# Patient Record
Sex: MALE | Race: WHITE | URBAN - METROPOLITAN AREA
[De-identification: names, ages, dates, MRNs, and addresses within clinical notes are randomized per-mention and may not be internally consistent; named-entity substitution may affect disease eponyms.]

---

## 2018-09-21 ENCOUNTER — ANESTHESIA EVENT (OUTPATIENT)
Dept: OPERATING ROOM | Age: 63
End: 2018-09-21
Payer: COMMERCIAL

## 2018-09-21 ENCOUNTER — PAT TELEPHONE (OUTPATIENT)
Dept: PREADMISSION TESTING | Age: 63
End: 2018-09-21

## 2018-09-21 VITALS — WEIGHT: 215 LBS | HEIGHT: 72 IN | BODY MASS INDEX: 29.12 KG/M2

## 2018-09-21 RX ORDER — CIPROFLOXACIN 2 MG/ML
400 INJECTION, SOLUTION INTRAVENOUS ONCE
Status: CANCELLED | OUTPATIENT
Start: 2018-09-21 | End: 2018-09-21

## 2018-09-21 RX ORDER — METHOCARBAMOL 500 MG/1
500 TABLET, FILM COATED ORAL 4 TIMES DAILY
COMMUNITY

## 2018-09-21 RX ORDER — MULTIVIT-MIN/IRON/FOLIC ACID/K 18-600-40
CAPSULE ORAL
COMMUNITY

## 2018-09-21 RX ORDER — DULOXETIN HYDROCHLORIDE 30 MG/1
30 CAPSULE, DELAYED RELEASE ORAL NIGHTLY
COMMUNITY

## 2018-09-21 RX ORDER — THEOPHYLLINE 300 MG/1
300 TABLET, EXTENDED RELEASE ORAL 2 TIMES DAILY
COMMUNITY

## 2018-09-21 RX ORDER — GABAPENTIN 600 MG/1
600 TABLET ORAL 4 TIMES DAILY
COMMUNITY

## 2018-09-21 RX ORDER — IPRATROPIUM BROMIDE AND ALBUTEROL SULFATE 2.5; .5 MG/3ML; MG/3ML
1 SOLUTION RESPIRATORY (INHALATION) EVERY 4 HOURS
COMMUNITY

## 2018-09-21 RX ORDER — ASPIRIN 81 MG/1
81 TABLET, CHEWABLE ORAL DAILY
COMMUNITY

## 2018-09-21 NOTE — PRE-PROCEDURE INSTRUCTIONS
C-Difficile admission screening and protocol:     * Admitted with diarrhea? no     *Prior history of C-Diff. In last 3 months?yes     *Antibiotic use in the past 6-8 weeks?yes     *Prior hospitalization or nursing home in the last month?   no         [address] time___1100 per pt_________        Surgery time____1300________    Take the following medications with a sip of water:Do duoneb,dulera, albuterol Take your inhaler as directed the DOS and bring with you DOS. gabapentin and theodur    Do not eat or drink anything after 12:00 midnight prior to your surgery. This includes water chewing gum, mints and ice chips. You may brush your teeth and gargle the morning of your surgery, but do not swallow the water     Please see your family doctor/pediatrician for a history and physical and/or concerning medications. Bring any test results/reports from your physicians office. If you are under the care of a heart doctor or specialist doctor, please be aware that you may be asked to them for clearance    You may be asked to stop blood thinners such as Coumadin, Plavix, Fragmin, Lovenox, etc., or any anti-inflammatories such as:  Aspirin, Ibuprofen, Advil, Naproxen prior to your surgery. We also ask that you stop any OTC medications such as fish oil, vitamin E, glucosamine, garlic, Multivitamins, COQ 10, etc.    We ask that you do not smoke 24 hours prior to surgery  We ask that you do not  drink any alcoholic beverages 24 hours prior to surgery     You must make arrangements for a responsible adult to take you home after your surgery. For your safety you will not be allowed to leave alone or drive yourself home. Your surgery will be cancelled if you do not have a ride home. Also for your safety, it is strongly suggested that someone stay with you the first 24 hours after your surgery.      A parent or legal guardian must accompany a child scheduled for surgery and plan to stay at the hospital until the child is discharged. Please do not bring other children with you. For your comfort, please wear simple loose fitting clothing to the hospital.  Please do not bring valuables. Do not wear any make-up or nail polish on your fingers or toes      For your safety, please do not wear any jewelry or body piercing's on the day of surgery. All jewelry must be removed. If you have dentures, they will be removed before going to operating room. For your convenience, we will provide you with a container. If you wear contact lenses or glasses, they will be removed, please bring a case for them. If you have a living will and a durable power of  for healthcare, please bring in a copy. As part of our patient safety program to minimize surgical site infections, we ask you to do the following:    · Please notify your surgeon if you develop any illness between         now and the  day of your surgery. · This includes a cough, cold, fever, sore throat, nausea,         or vomiting, and diarrhea, etc.  ·  Please notify your surgeon if you experience dizziness, shortness         of breath or blurred vision between now and the time of your surgery. Do not shave your operative site 96 hours prior to surgery. For face and neck surgery, men may use an electric razor 48 hours   prior to surgery. You may shower the night before surgery or the morning of   your surgery with an antibacterial soap. You will need to bring a photo ID and insurance card    [facility] has an onsite pharmacy, would you like to utilize our pharmacy     If you will be staying overnight and use a C-pap machine, please bring   your C-pap to hospital     Our goal is to provide you with excellent care, therefore, visitors will be limited to two(2) in the room at a time so that we may focus on providing this care for you.           Please contact pre-admission testing if you have

## 2018-09-24 ENCOUNTER — HOSPITAL ENCOUNTER (OUTPATIENT)
Dept: CT IMAGING | Age: 63
Discharge: HOME | End: 2018-09-24
Attending: UROLOGY
Payer: COMMERCIAL

## 2018-09-24 ENCOUNTER — ANESTHESIA (OUTPATIENT)
Dept: OPERATING ROOM | Age: 63
End: 2018-09-24
Payer: COMMERCIAL

## 2018-09-24 ENCOUNTER — HOSPITAL ENCOUNTER (OUTPATIENT)
Age: 63
Setting detail: OUTPATIENT SURGERY
Discharge: HOME | End: 2018-09-24
Attending: UROLOGY | Admitting: UROLOGY
Payer: COMMERCIAL

## 2018-09-24 VITALS
SYSTOLIC BLOOD PRESSURE: 125 MMHG | OXYGEN SATURATION: 96 % | DIASTOLIC BLOOD PRESSURE: 71 MMHG | BODY MASS INDEX: 28.79 KG/M2 | HEIGHT: 72 IN | WEIGHT: 212.52 LBS | RESPIRATION RATE: 16 BRPM | TEMPERATURE: 97 F | HEART RATE: 83 BPM

## 2018-09-24 VITALS — SYSTOLIC BLOOD PRESSURE: 118 MMHG | OXYGEN SATURATION: 96 % | DIASTOLIC BLOOD PRESSURE: 71 MMHG

## 2018-09-24 DIAGNOSIS — N10 ACUTE PYELONEPHRITIS: ICD-10-CM

## 2018-09-24 DIAGNOSIS — Z87.440 HISTORY OF URINARY TRACT INFECTION: ICD-10-CM

## 2018-09-24 DIAGNOSIS — N13.8 ENLARGED PROSTATE WITH URINARY OBSTRUCTION: ICD-10-CM

## 2018-09-24 DIAGNOSIS — N39.0 URINARY TRACT INFECTION: ICD-10-CM

## 2018-09-24 DIAGNOSIS — N40.1 ENLARGED PROSTATE WITH URINARY OBSTRUCTION: ICD-10-CM

## 2018-09-24 LAB
GFR AFRICAN AMERICAN: >60
GFR NON-AFRICAN AMERICAN: >60
PERFORMED ON: NORMAL
POC CREATININE: 1 MG/DL (ref 0.8–1.3)
POC SAMPLE TYPE: NORMAL

## 2018-09-24 PROCEDURE — 7100000000 HC PACU RECOVERY - FIRST 15 MIN: Performed by: UROLOGY

## 2018-09-24 PROCEDURE — 6360000004 HC RX CONTRAST MEDICATION: Performed by: UROLOGY

## 2018-09-24 PROCEDURE — 6360000002 HC RX W HCPCS: Performed by: NURSE ANESTHETIST, CERTIFIED REGISTERED

## 2018-09-24 PROCEDURE — 2580000003 HC RX 258: Performed by: UROLOGY

## 2018-09-24 PROCEDURE — 7100000010 HC PHASE II RECOVERY - FIRST 15 MIN: Performed by: UROLOGY

## 2018-09-24 PROCEDURE — 3600000002 HC SURGERY LEVEL 2 BASE: Performed by: UROLOGY

## 2018-09-24 PROCEDURE — 3700000000 HC ANESTHESIA ATTENDED CARE: Performed by: UROLOGY

## 2018-09-24 PROCEDURE — 7100000011 HC PHASE II RECOVERY - ADDTL 15 MIN: Performed by: UROLOGY

## 2018-09-24 PROCEDURE — 2580000003 HC RX 258: Performed by: ANESTHESIOLOGY

## 2018-09-24 PROCEDURE — 3700000001 HC ADD 15 MINUTES (ANESTHESIA): Performed by: UROLOGY

## 2018-09-24 PROCEDURE — 74178 CT ABD&PLV WO CNTR FLWD CNTR: CPT

## 2018-09-24 PROCEDURE — 82565 ASSAY OF CREATININE: CPT

## 2018-09-24 PROCEDURE — 2709999900 HC NON-CHARGEABLE SUPPLY: Performed by: UROLOGY

## 2018-09-24 PROCEDURE — 6360000002 HC RX W HCPCS: Performed by: UROLOGY

## 2018-09-24 PROCEDURE — 3600000012 HC SURGERY LEVEL 2 ADDTL 15MIN: Performed by: UROLOGY

## 2018-09-24 PROCEDURE — 2500000003 HC RX 250 WO HCPCS: Performed by: NURSE ANESTHETIST, CERTIFIED REGISTERED

## 2018-09-24 RX ORDER — PROPOFOL 10 MG/ML
INJECTION, EMULSION INTRAVENOUS CONTINUOUS PRN
Status: DISCONTINUED | OUTPATIENT
Start: 2018-09-24 | End: 2018-09-24 | Stop reason: SDUPTHER

## 2018-09-24 RX ORDER — CIPROFLOXACIN 2 MG/ML
400 INJECTION, SOLUTION INTRAVENOUS ONCE
Status: COMPLETED | OUTPATIENT
Start: 2018-09-24 | End: 2018-09-24

## 2018-09-24 RX ORDER — LIDOCAINE HYDROCHLORIDE 20 MG/ML
INJECTION, SOLUTION INFILTRATION; PERINEURAL PRN
Status: DISCONTINUED | OUTPATIENT
Start: 2018-09-24 | End: 2018-09-24 | Stop reason: SDUPTHER

## 2018-09-24 RX ORDER — MORPHINE SULFATE 2 MG/ML
1 INJECTION, SOLUTION INTRAMUSCULAR; INTRAVENOUS EVERY 5 MIN PRN
Status: DISCONTINUED | OUTPATIENT
Start: 2018-09-24 | End: 2018-09-24 | Stop reason: HOSPADM

## 2018-09-24 RX ORDER — DEXAMETHASONE SODIUM PHOSPHATE 4 MG/ML
INJECTION, SOLUTION INTRA-ARTICULAR; INTRALESIONAL; INTRAMUSCULAR; INTRAVENOUS; SOFT TISSUE PRN
Status: DISCONTINUED | OUTPATIENT
Start: 2018-09-24 | End: 2018-09-24 | Stop reason: SDUPTHER

## 2018-09-24 RX ORDER — TAMSULOSIN HYDROCHLORIDE 0.4 MG/1
0.4 CAPSULE ORAL DAILY
Qty: 90 CAPSULE | Refills: 3 | Status: SHIPPED | OUTPATIENT
Start: 2018-09-24 | End: 2018-09-24

## 2018-09-24 RX ORDER — MORPHINE SULFATE 2 MG/ML
2 INJECTION, SOLUTION INTRAMUSCULAR; INTRAVENOUS EVERY 5 MIN PRN
Status: DISCONTINUED | OUTPATIENT
Start: 2018-09-24 | End: 2018-09-24 | Stop reason: HOSPADM

## 2018-09-24 RX ORDER — MEPERIDINE HYDROCHLORIDE 25 MG/ML
12.5 INJECTION INTRAMUSCULAR; INTRAVENOUS; SUBCUTANEOUS EVERY 5 MIN PRN
Status: DISCONTINUED | OUTPATIENT
Start: 2018-09-24 | End: 2018-09-24 | Stop reason: HOSPADM

## 2018-09-24 RX ORDER — MIDAZOLAM HYDROCHLORIDE 1 MG/ML
INJECTION INTRAMUSCULAR; INTRAVENOUS PRN
Status: DISCONTINUED | OUTPATIENT
Start: 2018-09-24 | End: 2018-09-24 | Stop reason: SDUPTHER

## 2018-09-24 RX ORDER — FENTANYL CITRATE 50 UG/ML
INJECTION, SOLUTION INTRAMUSCULAR; INTRAVENOUS PRN
Status: DISCONTINUED | OUTPATIENT
Start: 2018-09-24 | End: 2018-09-24 | Stop reason: SDUPTHER

## 2018-09-24 RX ORDER — OXYCODONE HYDROCHLORIDE AND ACETAMINOPHEN 5; 325 MG/1; MG/1
1 TABLET ORAL PRN
Status: DISCONTINUED | OUTPATIENT
Start: 2018-09-24 | End: 2018-09-24 | Stop reason: HOSPADM

## 2018-09-24 RX ORDER — FENTANYL CITRATE 50 UG/ML
50 INJECTION, SOLUTION INTRAMUSCULAR; INTRAVENOUS EVERY 5 MIN PRN
Status: DISCONTINUED | OUTPATIENT
Start: 2018-09-24 | End: 2018-09-24 | Stop reason: HOSPADM

## 2018-09-24 RX ORDER — TAMSULOSIN HYDROCHLORIDE 0.4 MG/1
0.4 CAPSULE ORAL DAILY
Qty: 90 CAPSULE | Refills: 3 | Status: SHIPPED | OUTPATIENT
Start: 2018-09-24

## 2018-09-24 RX ORDER — SODIUM CHLORIDE 0.9 % (FLUSH) 0.9 %
10 SYRINGE (ML) INJECTION EVERY 12 HOURS SCHEDULED
Status: DISCONTINUED | OUTPATIENT
Start: 2018-09-24 | End: 2018-09-24 | Stop reason: HOSPADM

## 2018-09-24 RX ORDER — OXYCODONE HYDROCHLORIDE AND ACETAMINOPHEN 5; 325 MG/1; MG/1
2 TABLET ORAL PRN
Status: DISCONTINUED | OUTPATIENT
Start: 2018-09-24 | End: 2018-09-24 | Stop reason: HOSPADM

## 2018-09-24 RX ORDER — SODIUM CHLORIDE 9 MG/ML
INJECTION, SOLUTION INTRAVENOUS CONTINUOUS
Status: DISCONTINUED | OUTPATIENT
Start: 2018-09-24 | End: 2018-09-24 | Stop reason: HOSPADM

## 2018-09-24 RX ORDER — GLYCOPYRROLATE 0.2 MG/ML
INJECTION INTRAMUSCULAR; INTRAVENOUS PRN
Status: DISCONTINUED | OUTPATIENT
Start: 2018-09-24 | End: 2018-09-24 | Stop reason: SDUPTHER

## 2018-09-24 RX ORDER — ONDANSETRON 2 MG/ML
INJECTION INTRAMUSCULAR; INTRAVENOUS PRN
Status: DISCONTINUED | OUTPATIENT
Start: 2018-09-24 | End: 2018-09-24 | Stop reason: SDUPTHER

## 2018-09-24 RX ORDER — MAGNESIUM HYDROXIDE 1200 MG/15ML
LIQUID ORAL
Status: COMPLETED | OUTPATIENT
Start: 2018-09-24 | End: 2018-09-24

## 2018-09-24 RX ORDER — FINASTERIDE 5 MG/1
5 TABLET, FILM COATED ORAL DAILY
Qty: 90 TABLET | Refills: 3 | Status: SHIPPED | OUTPATIENT
Start: 2018-09-24 | End: 2018-09-24

## 2018-09-24 RX ORDER — FENTANYL CITRATE 50 UG/ML
25 INJECTION, SOLUTION INTRAMUSCULAR; INTRAVENOUS EVERY 5 MIN PRN
Status: DISCONTINUED | OUTPATIENT
Start: 2018-09-24 | End: 2018-09-24 | Stop reason: HOSPADM

## 2018-09-24 RX ORDER — FINASTERIDE 5 MG/1
5 TABLET, FILM COATED ORAL DAILY
Qty: 90 TABLET | Refills: 3 | Status: SHIPPED | OUTPATIENT
Start: 2018-09-24

## 2018-09-24 RX ORDER — SODIUM CHLORIDE 0.9 % (FLUSH) 0.9 %
10 SYRINGE (ML) INJECTION PRN
Status: DISCONTINUED | OUTPATIENT
Start: 2018-09-24 | End: 2018-09-24 | Stop reason: HOSPADM

## 2018-09-24 RX ORDER — ONDANSETRON 2 MG/ML
4 INJECTION INTRAMUSCULAR; INTRAVENOUS
Status: DISCONTINUED | OUTPATIENT
Start: 2018-09-24 | End: 2018-09-24 | Stop reason: HOSPADM

## 2018-09-24 RX ADMIN — CIPROFLOXACIN 400 MG: 2 INJECTION, SOLUTION INTRAVENOUS at 13:43

## 2018-09-24 RX ADMIN — IOPAMIDOL 75 ML: 755 INJECTION, SOLUTION INTRAVENOUS at 13:21

## 2018-09-24 RX ADMIN — SODIUM CHLORIDE: 9 INJECTION, SOLUTION INTRAVENOUS at 11:39

## 2018-09-24 RX ADMIN — LIDOCAINE HYDROCHLORIDE 100 MG: 20 INJECTION, SOLUTION INFILTRATION; PERINEURAL at 14:43

## 2018-09-24 RX ADMIN — DEXAMETHASONE SODIUM PHOSPHATE 8 MG: 4 INJECTION, SOLUTION INTRAMUSCULAR; INTRAVENOUS at 14:45

## 2018-09-24 RX ADMIN — ONDANSETRON 4 MG: 2 INJECTION INTRAMUSCULAR; INTRAVENOUS at 14:43

## 2018-09-24 RX ADMIN — DIATRIZOATE MEGLUMINE AND DIATRIZOATE SODIUM 30 ML: 600; 100 SOLUTION ORAL; RECTAL at 13:21

## 2018-09-24 RX ADMIN — FENTANYL CITRATE 100 MCG: 50 INJECTION INTRAMUSCULAR; INTRAVENOUS at 14:43

## 2018-09-24 RX ADMIN — GLYCOPYRROLATE 0.2 MG: 0.2 INJECTION, SOLUTION INTRAMUSCULAR; INTRAVENOUS at 14:45

## 2018-09-24 RX ADMIN — PROPOFOL 140 MCG/KG/MIN: 10 INJECTION, EMULSION INTRAVENOUS at 14:43

## 2018-09-24 RX ADMIN — MIDAZOLAM 2 MG: 1 INJECTION INTRAMUSCULAR; INTRAVENOUS at 14:43

## 2018-09-24 ASSESSMENT — PULMONARY FUNCTION TESTS
PIF_VALUE: 0
PIF_VALUE: 16
PIF_VALUE: 0

## 2018-09-24 ASSESSMENT — PAIN - FUNCTIONAL ASSESSMENT: PAIN_FUNCTIONAL_ASSESSMENT: 0-10

## 2018-09-24 ASSESSMENT — PAIN SCALES - GENERAL
PAINLEVEL_OUTOF10: 0
PAINLEVEL_OUTOF10: 0

## 2018-09-24 ASSESSMENT — LIFESTYLE VARIABLES: SMOKING_STATUS: 0

## 2018-09-24 NOTE — ANESTHESIA PRE PROCEDURE
Tests: No results for input(s): POCGLU, POCNA, POCK, POCCL, POCBUN, POCHEMO, POCHCT in the last 72 hours. Coags: No results found for: PROTIME, INR, APTT    HCG (If Applicable): No results found for: PREGTESTUR, PREGSERUM, HCG, HCGQUANT     ABGs: No results found for: PHART, PO2ART, GIO6MYX, FTS7DTC, BEART, J8EMYGZV     Type & Screen (If Applicable):  No results found for: LABABO, [address]    Anesthesia Evaluation  Patient summary reviewed no history of anesthetic complications:   Airway: Mallampati: III  TM distance: <3 FB   Neck ROM: limited  Mouth opening: > = 3 FB Dental:    (+) upper dentures      Pulmonary: breath sounds clear to auscultation  (+) COPD (DAILY AND PRN INHALERS / 3l O2 AT NITE):      (-) not a current smoker          Patient did not smoke on day of surgery. Cardiovascular:Negative CV ROS            Rhythm: regular  Rate: normal           Beta Blocker:  Not on Beta Blocker         Neuro/Psych:   Negative Neuro/Psych ROS              GI/Hepatic/Renal:   (+) renal disease ([place]):,           Endo/Other:    (+) no malignancy/cancer. (-) diabetes mellitus, hypothyroidism, hyperthyroidism, no malignancy/cancer               Abdominal:   (+) obese,     Abdomen: soft. Vascular: negative vascular ROS. Anesthesia Plan      TIVA     ASA 3       Induction: intravenous. MIPS: Postoperative opioids intended and Prophylactic antiemetics administered. Anesthetic plan and risks discussed with patient and spouse. Plan discussed with CRNA. This pre-anesthesia assessment may be used as a history and physical.    DOS STAFF ADDENDUM:    Pt seen and examined, chart reviewed (including anesthesia, drug and allergy history). No interval changes to history and physical examination. Anesthetic plan, risks, benefits, alternatives, and personnel involved discussed with patient.   Patient verbalized an understanding and agrees to

## 2018-09-24 NOTE — INTERVAL H&P NOTE
Preoperative H&P Update    The patient's History and Physical in the medical record  was reviewed by me today. I reviewed the HPI, medications, allergies, reason for surgery, diagnosis and treatment plan and there has been no change.     Electronically signed by Kendall Walden MD on 9/24/2018 at 2:22 PM

## 2018-09-24 NOTE — BRIEF OP NOTE
Brief Postoperative Note    Libra Lynn  YOB: 1955  [id number]    Pre-operative Diagnosis: recurrent UTI    Post-operative Diagnosis: Same, BPH    Procedure: cysto    Anesthesia: MAC    Surgeons/Assistants: Bonny Minor    Estimated Blood Loss: less than 50     Complications: None    Specimens: Was Not Obtained    Findings: No fistula on CT, BPH on cysto, no strictures, large left hydrocele, hypospadias    Electronically signed by Deloris Garduno MD on 9/24/2018 at 3:00 PM

## 2018-09-26 NOTE — OP NOTE
hyperplasia with very small  median lobe component. The bladder was surveyed in its entirety. The  walls appeared smooth. There were no stones, tumors, trabeculation, or  inflammation. Bilateral ureteral orifices were identified in the expected  position. The bladder was drained. The scope was removed, and the  procedure was ended. The patient tolerated the procedure well, and was  taken to recovery room in good condition. He will be started on Flomax and  finasteride due to his symptoms and follow up in the office in about six  months. If he does get another recurrent UTI, I would recommend  transurethral resection of the prostate to him.         Salty Jacinto MD    D: 09/25/2018 17:45:57       T: 09/26/2018 0:37:10     [email or web address]  Job#: [id number]     Doc#: [id number]    CC:

## (undated) DEVICE — Y-TYPE TUR/BLADDER IRRIGATION SET, REGULATING CLAMP

## (undated) DEVICE — TOWEL,OR,DSP,ST,BLUE,STD,4/PK,20PK/CS: Brand: MEDLINE

## (undated) DEVICE — Z INACTIVE USE 2635503 SOLUTION IRRIG 3000ML ST H2O USP UROMATIC PLAS CONT

## (undated) DEVICE — SOLUTION IV IRRIG WATER 1000ML POUR BRL 2F7114

## (undated) DEVICE — SKIN MARKER REGULAR TIP WITH RULER CAP AND LABELS: Brand: DEVON

## (undated) DEVICE — KIT OR ROOM TURNOVER W/STRAP

## (undated) DEVICE — STERILE SURGICAL LUBRICANT, METAL TUBE: Brand: SURGILUBE

## (undated) DEVICE — BAG URO DRN URO-CATCHER

## (undated) DEVICE — SYRINGE MED 10ML LUERLOCK TIP W/O SFTY DISP

## (undated) DEVICE — GLOVE SURG SZ 75 L12IN FNGR THK87MIL WHT LTX FREE

## (undated) DEVICE — X-RAY DETECTABLE SPONGES,16 PLY: Brand: VISTEC

## (undated) DEVICE — PACK,CYSTOSCOPY,PK III,AURORA: Brand: MEDLINE

## (undated) DEVICE — SOLUTION SCRB 4OZ 7.5% POVIDONE IOD FLIP TOP CAP

## (undated) DEVICE — Z DISCONTINUED BY MEDLINE USE 2711682 TRAY SKIN PREP DRY W/ PREM GLV